# Patient Record
Sex: FEMALE | Race: BLACK OR AFRICAN AMERICAN | URBAN - METROPOLITAN AREA
[De-identification: names, ages, dates, MRNs, and addresses within clinical notes are randomized per-mention and may not be internally consistent; named-entity substitution may affect disease eponyms.]

---

## 2017-04-24 ENCOUNTER — OFFICE VISIT (OUTPATIENT)
Dept: FAMILY MEDICINE CLINIC | Facility: CLINIC | Age: 22
End: 2017-04-24

## 2017-04-24 VITALS
WEIGHT: 191.4 LBS | RESPIRATION RATE: 20 BRPM | TEMPERATURE: 98.4 F | HEART RATE: 78 BPM | SYSTOLIC BLOOD PRESSURE: 134 MMHG | HEIGHT: 66 IN | BODY MASS INDEX: 30.76 KG/M2 | OXYGEN SATURATION: 99 % | DIASTOLIC BLOOD PRESSURE: 79 MMHG

## 2017-04-24 DIAGNOSIS — L30.9 ECZEMA, UNSPECIFIED TYPE: Primary | ICD-10-CM

## 2017-04-24 DIAGNOSIS — N92.6 IRREGULAR MENSES: ICD-10-CM

## 2017-04-24 LAB
HCG URINE, QL. (POC): NEGATIVE
VALID INTERNAL CONTROL?: YES

## 2017-04-24 NOTE — MR AVS SNAPSHOT
Visit Information Date & Time Provider Department Dept. Phone Encounter #  
 4/24/2017  1:30 PM Aidee Chaidez MD [facility] [phone] [id number] Follow-up Instructions Return if symptoms worsen or fail to improve. Upcoming Health Maintenance Date Due Hepatitis A Peds Age 1-18 (1 of 2 - Standard Series) 12/26/1997 HPV AGE 9Y-26Y (1 of 3 - Female 3 Dose Series) 12/26/2007 DTaP/Tdap/Td series (2 - Td) 8/21/2008 Pneumococcal 19-64 Medium Risk (1 of 1 - PPSV23) 12/26/2015 INFLUENZA AGE 9 TO ADULT 8/1/2016 Allergies as of 4/24/2017  Review Complete On: 4/24/2017 By: Aidee Chaidez MD  
 No Known Allergies Current Immunizations  Never Reviewed Name Date Tdap 7/24/2008 Not reviewed this visit You Were Diagnosed With   
  
 Codes Comments Eczema, unspecified type    -  Primary ICD-10-CM: L30.9 ICD-9-CM: 692.9 Irregular menses     ICD-10-CM: N92.6 ICD-9-CM: 626.4 Vitals BP Pulse Temp Resp Height(growth percentile) Weight(growth percentile) 134/79 (BP 1 Location: Right arm, BP Patient Position: Sitting) 78 98.4 °F (36.9 °C) (Oral) 20 5' 6\" (1.676 m) 191 lb 6.4 oz (86.8 kg) LMP SpO2 BMI OB Status Smoking Status 03/29/2017 (Exact Date) 99% 30.89 kg/m2 Unknown Current Some Day Smoker BMI and BSA Data Body Mass Index Body Surface Area  
 30.89 kg/m 2 2.01 m 2 Preferred Pharmacy Pharmacy Name Phone [id number] [address] [phone] Your Updated Medication List  
  
   
This list is accurate as of: 4/24/17  2:29 PM.  Always use your most recent med list.  
  
  
  
  
 triamcinolone-emollient cmb#86 0.1 % Crea  
1 g by Apply Externally route two (2) times daily as needed. Indications: ATOPIC DERMATITIS Prescriptions Sent to Pharmacy Refills  
 triamcinolone-emollient cmb#86 0.1 % crea 5 Si g by Apply Externally route two (2) times daily as needed. Indications: ATOPIC DERMATITIS Class: Normal  
 Pharmacy: [address] [place] [address] [address] #: [phone] Route: Apply Externally We Performed the Following AMB POC URINE PREGNANCY TEST, VISUAL COLOR COMPARISON [96057 CPT(R)] REFERRAL TO OBSTETRICS AND GYNECOLOGY [REF51 Custom] Comments:  
 Please evaluate patient for contraceptive options. Follow-up Instructions Return if symptoms worsen or fail to improve. Referral Information Referral ID Referred By Referred To  
  
 [id number] Faviola Sanchez MD   
   [address] [facility], [address] Phone: [phone] Fax: [phone] Visits Status Start Date End Date 1 New Request 17 If your referral has a status of pending review or denied, additional information will be sent to support the outcome of this decision. Introducing [facility]! Delgado Chávez introduces TakeCare patient portal. Now you can access parts of your medical record, email your doctor's office, and request medication refills online. 1. In your internet browser, go to [email or web address] 2. Click on the First Time User? Click Here link in the Sign In box. You will see the New Member Sign Up page. 3. Enter your TakeCare Access Code exactly as it appears below. You will not need to use this code after youve completed the sign-up process. If you do not sign up before the expiration date, you must request a new code. · TakeCare Access Code: [email or web address] Expires: 2017  1:51 PM 
 
4. Enter the last four digits of your Social Security Number (xxxx) and Date of Birth (mm/dd/yyyy) as indicated and click Submit. You will be taken to the next sign-up page. 5. Create a TakeCare ID.  This will be your TakeCare login ID and cannot be changed, so think of one that is secure and easy to remember. 6. Create a [employer] password. You can change your password at any time. 7. Enter your Password Reset Question and Answer. This can be used at a later time if you forget your password. 8. Enter your e-mail address. You will receive e-mail notification when new information is available in [address] 9. Click Sign Up. You can now view and download portions of your medical record. 10. Click the Download Summary menu link to download a portable copy of your medical information. If you have questions, please visit the Frequently Asked Questions section of the [employer] website. Remember, [employer] is NOT to be used for urgent needs. For medical emergencies, dial 911. Now available from your iPhone and Android! Please provide this summary of care documentation to your next provider. Your primary care clinician is listed as Toro Renae. If you have any questions after today's visit, please call [phone].

## 2017-04-24 NOTE — PROGRESS NOTES
HISTORY OF PRESENT ILLNESS  Dutch Hernandez is a 22 y.o. female. HPI Comments: Presents to establish care for eczema. She isn't using anything for her eczema right now, but has done well of TAC in Eucerin cream in the past. No allergies or asthma. She is interested in exploring her contraceptive options (her previous GYN tried OCP's, but she had trouble taking them daily). She smokes socially - a few puffs now and then with friends. She has recently modified her diet in an effort to lose weight - no formal exercise. She had the HPV series in [place] in about 2015. Contraception   Pertinent negatives include no chest pain, no headaches and no shortness of breath. Past Medical History:   Diagnosis Date    Eczema        History reviewed. No pertinent surgical history. History   Smoking Status    Current Some Day Smoker    Types: Cigarettes   Smokeless Tobacco    Never Used       Review of Systems   Constitutional: Negative for chills, fever and weight loss. HENT: Negative for congestion and sore throat. Eyes: Negative for blurred vision and double vision. Respiratory: Negative for cough and shortness of breath. Cardiovascular: Negative for chest pain, palpitations and leg swelling. Gastrointestinal: Negative for heartburn, nausea and vomiting. Genitourinary: Negative for dysuria, frequency and urgency. Musculoskeletal: Negative for back pain and neck pain. Skin: Positive for itching and rash. Neurological: Negative for dizziness, tingling, focal weakness and headaches. Endo/Heme/Allergies: Negative for environmental allergies. Psychiatric/Behavioral: Negative for depression. The patient is not nervous/anxious and does not have insomnia.       Visit Vitals    /79 (BP 1 Location: Right arm, BP Patient Position: Sitting)    Pulse 78    Temp 98.4 °F (36.9 °C) (Oral)    Resp 20    Ht 5' 6\" (1.676 m)    Wt 191 lb 6.4 oz (86.8 kg)    LMP 03/29/2017 (Exact Date)    SpO2 99%  BMI 30.89 kg/m2       Physical Exam   Constitutional: She is oriented to person, place, and time. She appears well-developed and well-nourished. No distress. HENT:   Right Ear: Tympanic membrane, external ear and ear canal normal.   Left Ear: Tympanic membrane, external ear and ear canal normal.   Nose: Nose normal.   Mouth/Throat: Oropharynx is clear and moist.   Eyes: Conjunctivae and EOM are normal. Pupils are equal, round, and reactive to light. Neck: Neck supple. No thyromegaly present. Cardiovascular: Normal rate, regular rhythm and intact distal pulses. Exam reveals no gallop and no friction rub. No murmur heard. Pulmonary/Chest: Effort normal and breath sounds normal. No respiratory distress. Abdominal: Soft. She exhibits no mass. There is no tenderness. Musculoskeletal: She exhibits no edema. Lymphadenopathy:     She has no cervical adenopathy. Neurological: She is alert and oriented to person, place, and time. She has normal strength and normal reflexes. No cranial nerve deficit. She displays a negative Romberg sign. Gait normal.   Skin: Skin is warm and dry. Rash: eczema in antecubital fossae. Psychiatric: She has a normal mood and affect. Her behavior is normal. Judgment and thought content normal.     Urine HCG negative    ASSESSMENT and PLAN    ICD-10-CM ICD-9-CM    1. Eczema, unspecified type L30.9 692.9 triamcinolone-emollient cmb#86 0.1 % crea   2. Irregular menses N92.6 626.4 AMB POC URINE PREGNANCY TEST, VISUAL COLOR COMPARISON      REFERRAL TO OBSTETRICS AND GYNECOLOGY     Follow-up Disposition:  Return if symptoms worsen or fail to improve. the following changes in treatment are made: TAC in Eucerin prescribed (will need to be compounded) for prn use - advised to use plain Eucerin when no flare-up.   Referral to GYN.  lab results and schedule of future lab studies reviewed with patient  reviewed diet, exercise and weight control  reviewed medications and side effects in detail  Will obtain previous records from GYN. Plan of care reviewed - patient verbalize(s) understanding and agreement.

## 2017-04-24 NOTE — PROGRESS NOTES
1. Have you been to the ER, urgent care clinic since your last visit? Hospitalized since your last visit? No    2. Have you seen or consulted any other health care providers outside of the [address] [place] [address] since your last visit? Include any pap smears or colon screening.  No     [facility] November 2016

## 2018-07-06 ENCOUNTER — TELEPHONE (OUTPATIENT)
Dept: FAMILY MEDICINE CLINIC | Facility: CLINIC | Age: 23
End: 2018-07-06

## 2018-07-06 NOTE — TELEPHONE ENCOUNTER
Pharmacist called from [facility] stating that the prescription sent over by provider did not exist (Triamcinolone- Emollient). Pharmacist stated that he is going to delete that prescription as it has never existed, and request that provider sends over a new prescription that exists.

## 2018-07-13 NOTE — TELEPHONE ENCOUNTER
Spoke with pharmacy yesterday, issue resolved. Plain Triamcinolone called in.        Encounter to be closed